# Patient Record
(demographics unavailable — no encounter records)

---

## 2024-11-14 NOTE — ASSESSMENT
[FreeTextEntry1] : .   [facility]   S/p self inflicted accidental laceraiton of R hand 2nd and 3rd digits, still painful and swollen 3 months later, impairs his ability to do his job.  CT hand w/w/o IV contrast r/o tendon injury. Swelling significant r/o fluid collection.   Flu vaccine declined

## 2024-11-14 NOTE — PHYSICAL EXAM
[Normal] : no CVA or spinal tenderness [Normal Gait] : normal gait [Normal Affect] : the affect was normal [de-identified] : R hand: General swelling of 2nd and 3rd digit, distal palm swelling. Pain with palpation of the fingers. 4/5  strength.

## 2024-11-14 NOTE — HISTORY OF PRESENT ILLNESS
[FreeTextEntry1] : CPE, finger injury  [de-identified] : 41 year old left handed male here to establish care. Patient reports that 3 months ago he was clam fishing, using his knife to open the clams, he missed and stabbed himself in his right hand at the 2nd interdigital web space, lateral 2nd digit proximal phalanx, and medial 3rd digit proximal phalanx. Has photos of the cuts from the day of the incident, significantly deep injuries. He did not get stiches or seek medical evaluation. No infection of the area reported, no redness or fevers. Since then he has difficulty closing digits 2 and 3. He is an , works with his hands has been able to do his job but not easily. The upper part of his palm and injured digits have been swollen since then, occasionally has sharp shooting pains down his fingers. Has tried cold packs without improvement.

## 2024-11-14 NOTE — HEALTH RISK ASSESSMENT
[Good] : ~his/her~  mood as  good [Yes] : Yes [2 - 4 times a month (2 pts)] : 2-4 times a month (2 points) [3 or 4 (1 pt)] : 3 or 4  (1 point) [Never (0 pts)] : Never (0 points) [0] : 2) Feeling down, depressed, or hopeless: Not at all (0) [PHQ-2 Negative - No further assessment needed] : PHQ-2 Negative - No further assessment needed [I have developed a follow-up plan documented below in the note.] : I have developed a follow-up plan documented below in the note. [HIV test declined] : HIV test declined [Hepatitis C test declined] : Hepatitis C test declined [With Family] : lives with family [Employed] : employed [] :  [Fully functional (bathing, dressing, toileting, transferring, walking, feeding)] : Fully functional (bathing, dressing, toileting, transferring, walking, feeding) [Fully functional (using the telephone, shopping, preparing meals, housekeeping, doing laundry, using] : Fully functional and needs no help or supervision to perform IADLs (using the telephone, shopping, preparing meals, housekeeping, doing laundry, using transportation, managing medications and managing finances) [Smoke Detector] : smoke detector [Never] : Never [Reports changes in hearing] : Reports no changes in hearing [Reports changes in vision] : Reports no changes in vision [Reports changes in dental health] : Reports no changes in dental health [de-identified] : Wife, 2 children  [FreeTextEntry2] :

## 2025-05-19 NOTE — ASSESSMENT
[FreeTextEntry1] :  No improvement with topical steroids. Appearance not typical of heat rash, more in line with tinea versicolor.  Terbinafine BID trial Call back if no improvment  Patient expressed understanding, and all questions were answered.

## 2025-05-19 NOTE — PHYSICAL EXAM
[Normal] : no CVA or spinal tenderness [Normal Gait] : normal gait [Normal Affect] : the affect was normal [de-identified] : pink circular macules/patches across chest and back

## 2025-05-19 NOTE — HISTORY OF PRESENT ILLNESS
[FreeTextEntry8] : 41M here for few weeks of small pink spots erupting across his chest and back. Patient works in construction, sweating frequently under layered clothes. He initially went to urgent care, recommended steroid cream which he was taking for ~2 weeks without any improvement. Rash is not itchy or painful. No fever or chills.